# Patient Record
Sex: FEMALE | Race: ASIAN | NOT HISPANIC OR LATINO | URBAN - METROPOLITAN AREA
[De-identification: names, ages, dates, MRNs, and addresses within clinical notes are randomized per-mention and may not be internally consistent; named-entity substitution may affect disease eponyms.]

---

## 2019-08-02 ENCOUNTER — EMERGENCY (EMERGENCY)
Facility: HOSPITAL | Age: 54
LOS: 1 days | Discharge: HOME | End: 2019-08-02
Attending: EMERGENCY MEDICINE | Admitting: EMERGENCY MEDICINE
Payer: SELF-PAY

## 2019-08-02 VITALS
DIASTOLIC BLOOD PRESSURE: 78 MMHG | SYSTOLIC BLOOD PRESSURE: 146 MMHG | RESPIRATION RATE: 16 BRPM | OXYGEN SATURATION: 100 % | HEART RATE: 64 BPM | TEMPERATURE: 98 F

## 2019-08-02 VITALS
TEMPERATURE: 98 F | OXYGEN SATURATION: 100 % | RESPIRATION RATE: 16 BRPM | HEART RATE: 63 BPM | SYSTOLIC BLOOD PRESSURE: 142 MMHG | DIASTOLIC BLOOD PRESSURE: 67 MMHG

## 2019-08-02 PROCEDURE — 70450 CT HEAD/BRAIN W/O DYE: CPT | Mod: 26

## 2019-08-02 PROCEDURE — 70486 CT MAXILLOFACIAL W/O DYE: CPT | Mod: 26

## 2019-08-02 PROCEDURE — 99284 EMERGENCY DEPT VISIT MOD MDM: CPT

## 2019-08-02 RX ORDER — ACETAMINOPHEN 500 MG
975 TABLET ORAL ONCE
Refills: 0 | Status: COMPLETED | OUTPATIENT
Start: 2019-08-02 | End: 2019-08-02

## 2019-08-02 RX ADMIN — Medication 975 MILLIGRAM(S): at 12:47

## 2019-08-02 NOTE — ED ADULT TRIAGE NOTE - CHIEF COMPLAINT QUOTE
Slip and fall in bathroom, hit right side of head om shower wall, + LOC. Today woke up with increase in facial pain, headache and right eye discharge. Denies n/v, chest pain or SOB. Ice pack provided.

## 2019-08-02 NOTE — ED PROVIDER NOTE - CLINICAL SUMMARY MEDICAL DECISION MAKING FREE TEXT BOX
pt with worsening headache after slip and fall to head yesterday w/ positive LOC, will CT r/o bleed, also bony facial pain w/o any deformity, will CT max fac r/o occult fx, nml extraoculars w/o e/o entrapment, will pain control

## 2019-08-02 NOTE — ED PROVIDER NOTE - NS ED ROS FT
Gen: No fever, normal appetite  Eyes: No eye irritation or discharge  ENT: + right facial pain   Resp: No cough or trouble breathing  Cardiovascular: No chest pain or palpitation  Gastroenteric: No nausea/vomiting, diarrhea, constipation  :  No change in urine output; no dysuria  MS: No joint or muscle pain  Skin: No rashes  Neuro: + headache   Remainder negative, except as per the HPI

## 2019-08-02 NOTE — ED PROVIDER NOTE - PHYSICAL EXAMINATION
GEN - NAD; well appearing; A+O x3   HEAD - frontal ecchymosis   EYES - minimal periorbital ecchymosis, EOMI, no conjunctival pallor, no scleral icterus  ENT -   mucous membranes  moist , no discharge      NECK - Neck supple  PULM - CTA b/l,  symmetric breath sounds  COR -  RRR, S1 S2, no murmurs  ABD - , ND, NT, soft, no guarding, no rebound, no masses    BACK - no CVA tenderness, nontender spine     EXTREMS - no edema, no deformity, warm and well perfused    SKIN - no rash or bruising      NEUROLOGIC - alert, sensation nl, motor 5/5 RUE/LUE/RLE/LLE

## 2019-08-02 NOTE — ED PROVIDER NOTE - NSFOLLOWUPINSTRUCTIONS_ED_ALL_ED_FT
AndreaicBosnianCanadian [facility] HospitalssianSpanishTagalogTraditional ChineseVietnamese    Concussion, Adult  Image   A concussion is a brain injury from a direct hit (blow) to the head or body. This blow causes the brain to shake quickly back and forth inside the skull. This can damage brain cells and cause chemical changes in the brain. A concussion may also be known as a mild traumatic brain injury (TBI).    Concussions are usually not life-threatening, but the effects of a concussion can be serious. If you have a concussion, you should be very careful to avoid having a second concussion.    What are the causes?  This condition is caused by:  A direct blow to your head, such as from running into another player during a game, being hit in a fight, or hitting your head on a hard surface.  Sudden movement of your body that causes your brain to move back and forth inside the skull, such as in a car crash.  What are the signs or symptoms?  The signs of a concussion can be hard to notice. Early on, they may be missed by you, family members, and health care providers. You may look fine on the outside, but may act or feel differently.    Symptoms are usually temporary, and for most adults, the symptoms improve in 7–10 days. Some symptoms may appear right away, but other symptoms may not show up for hours or days. If your symptoms last longer than normal, you may have post-concussion syndrome. Every head injury is different.    Physical symptoms     Headaches. This can include a feeling of pressure in the head or migraine-like symptoms.  Tiredness (fatigue).  Dizziness.  Problems with coordination or balance.  Vision or hearing problems.  Sensitivity to light or noise.  Nausea or vomiting.  Changes in eating or sleeping patterns.  Numbness or tingling.  Mental and emotional symptoms     Memory problems.  Trouble concentrating, organizing, or making decisions.  Slowness in thinking, acting or reacting, speaking, or reading.  Irritability or mood changes.  Anxiety or depression.  How is this diagnosed?  This condition is diagnosed based on:  Your symptoms.  A description of your injury.  You may also have tests, including:  Imaging tests, such as a CT scan or MRI. These are done to look for signs of brain injury.  Neuropsychological tests. These measure your thinking, understanding, learning, and remembering abilities.  How is this treated?  Treatment for this condition includes:  Stopping sports or activity if you are injured. Anyone who hits their head or shows signs of a concussion should not return to sports or activities the same day, and they should not return until they are checked by a health care provider.  Physical and mental rest and careful observation, usually at home. Gradually return to your normal activities.  Medicine. You may be prescribed medicines to help with symptoms such as headaches, nausea, or difficulty sleeping.  Avoid taking opioid pain medicine while recovering from a concussion.  Avoiding alcohol and drugs. Alcohol and certain drugs may slow your recovery and can put you at risk of further injury.  Referral to a concussion clinic or rehabilitation center.  How fast you will recover from a concussion depends on many factors. Recovery can take time. It is important to wait to return to activity until a health care provider says that it is safe and your symptoms are completely gone.    Follow these instructions at home:  Activity     Limit activities that require a lot of thought or concentration, such as:  Doing homework or job-related work.  Watching TV.  Working on the computer.  Playing memory games and puzzles.  Rest. Rest helps your brain heal. Make sure you:  Get plenty of sleep. Most adults should get 7–9 hours of sleep each night.  Rest during the day. Take naps or rest breaks when you feel tired.  Do not do high-risk activities that could cause a second concussion, such as riding a bike or playing sports. Having another concussion before the first one has healed can be dangerous.  Ask your health care provider when you can return to your normal activities, such as school, work, athletics, and driving. Your ability to react may be slower after a brain injury. Never do these activities if you are dizzy. Your health care provider will likely give you a plan for gradually returning to activities.  General instructions     Take over-the-counter and prescription medicines only as told by your health care provider. Some medicines, such as blood thinners (anticoagulants) and aspirin, may increase the chance of complications, such as bleeding.  Do not drink alcohol until your health care provider says you can.  Watch your symptoms and tell others to do the same. Complications sometimes occur after a concussion. Older adults with a brain injury may have a higher risk of serious complications.  Tell your , teachers, school nurse, school counselor, , or  about your injury, symptoms, and restrictions. Tell them about what you can or cannot do. They should watch you for worsening symptoms.  Keep all follow-up visits as told by your health care provider. This is important.  How is this prevented?  Avoiding another brain injury is very important, especially as you recover. In rare cases, another injury can lead to permanent brain damage, brain swelling, or death. The risk of this is greatest during the first 7–10 days after a head injury. Avoid injuries by:  Avoiding activities that could lead to a second concussion, such as contact or recreational sports, until your health care provider says it is okay.  Taking these actions once you have returned to sports or activities:  Avoiding plays or moves that can cause you to crash into another person. This is how most concussions occur.  Following the rules and being respectful of other players. Do not engage in violent or illegal plays.  Getting regular exercise that includes strength and balance training.  Wear a properly fitting helmet during sports, biking, or other activities. Helmets can help protect you from serious skull and brain injuries, but they do not protect you from a concussion. Even when wearing a helmet, you should avoid being hit in the head.    Contact a health care provider if:  Your symptoms get worse or they do not improve.  You have new symptoms.  You have another injury.  Get help right away if:  You have severe or worsening headaches.  You have weakness or numbness in any part of your body.  You are confused.  Your coordination gets worse.  You vomit repeatedly.  You are sleepier than normal.  You have convulsions.  Your speech is slurred.  You cannot recognize people or places.  You have a seizure.  It is difficult to wake you up.  You have unusual behavior changes.  You have changes in your vision.  You lose consciousness.  Summary  A concussion is a brain injury from a direct hit (blow) to your head or body.  You may have imaging tests and neuropsychological tests to diagnose a concussion.  Treatment for this condition includes physical and mental rest and careful observation.  Ask your health care provider when you can return to your normal activities, such as school, work, athletics, and driving. Follow safety instructions as told by your health care provider.  This information is not intended to replace advice given to you by your health care provider. Make sure you discuss any questions you have with your health care provider.    Document Released: 03/09/2005 Document Revised: 01/24/2019 Document Reviewed: 01/24/2019  [employer] Interactive Patient Education © 2019 [employer] Concussion, Adult  Image   A concussion is a brain injury from a direct hit (blow) to the head or body. This blow causes the brain to shake quickly back and forth inside the skull. This can damage brain cells and cause chemical changes in the brain. A concussion may also be known as a mild traumatic brain injury (TBI).    Concussions are usually not life-threatening, but the effects of a concussion can be serious. If you have a concussion, you should be very careful to avoid having a second concussion.    What are the causes?  This condition is caused by:  A direct blow to your head, such as from running into another player during a game, being hit in a fight, or hitting your head on a hard surface.  Sudden movement of your body that causes your brain to move back and forth inside the skull, such as in a car crash.  What are the signs or symptoms?  The signs of a concussion can be hard to notice. Early on, they may be missed by you, family members, and health care providers. You may look fine on the outside, but may act or feel differently.    Symptoms are usually temporary, and for most adults, the symptoms improve in 7–10 days. Some symptoms may appear right away, but other symptoms may not show up for hours or days. If your symptoms last longer than normal, you may have post-concussion syndrome. Every head injury is different.    Physical symptoms     Headaches. This can include a feeling of pressure in the head or migraine-like symptoms.  Tiredness (fatigue).  Dizziness.  Problems with coordination or balance.  Vision or hearing problems.  Sensitivity to light or noise.  Nausea or vomiting.  Changes in eating or sleeping patterns.  Numbness or tingling.  Mental and emotional symptoms     Memory problems.  Trouble concentrating, organizing, or making decisions.  Slowness in thinking, acting or reacting, speaking, or reading.  Irritability or mood changes.  Anxiety or depression.  How is this diagnosed?  This condition is diagnosed based on:  Your symptoms.  A description of your injury.  You may also have tests, including:  Imaging tests, such as a CT scan or MRI. These are done to look for signs of brain injury.  Neuropsychological tests. These measure your thinking, understanding, learning, and remembering abilities.  How is this treated?  Treatment for this condition includes:  Stopping sports or activity if you are injured. Anyone who hits their head or shows signs of a concussion should not return to sports or activities the same day, and they should not return until they are checked by a health care provider.  Physical and mental rest and careful observation, usually at home. Gradually return to your normal activities.  Medicine. You may be prescribed medicines to help with symptoms such as headaches, nausea, or difficulty sleeping.  Avoid taking opioid pain medicine while recovering from a concussion.  Avoiding alcohol and drugs. Alcohol and certain drugs may slow your recovery and can put you at risk of further injury.  Referral to a concussion clinic or rehabilitation center.  How fast you will recover from a concussion depends on many factors. Recovery can take time. It is important to wait to return to activity until a health care provider says that it is safe and your symptoms are completely gone.    Follow these instructions at home:  Activity     Limit activities that require a lot of thought or concentration, such as:  Doing homework or job-related work.  Watching TV.  Working on the computer.  Playing memory games and puzzles.  Rest. Rest helps your brain heal. Make sure you:  Get plenty of sleep. Most adults should get 7–9 hours of sleep each night.  Rest during the day. Take naps or rest breaks when you feel tired.  Do not do high-risk activities that could cause a second concussion, such as riding a bike or playing sports. Having another concussion before the first one has healed can be dangerous.  Ask your health care provider when you can return to your normal activities, such as school, work, athletics, and driving. Your ability to react may be slower after a brain injury. Never do these activities if you are dizzy. Your health care provider will likely give you a plan for gradually returning to activities.  General instructions     Take over-the-counter and prescription medicines only as told by your health care provider. Some medicines, such as blood thinners (anticoagulants) and aspirin, may increase the chance of complications, such as bleeding.  Do not drink alcohol until your health care provider says you can.  Watch your symptoms and tell others to do the same. Complications sometimes occur after a concussion. Older adults with a brain injury may have a higher risk of serious complications.  Tell your , teachers, school nurse, school counselor, , or  about your injury, symptoms, and restrictions. Tell them about what you can or cannot do. They should watch you for worsening symptoms.  Keep all follow-up visits as told by your health care provider. This is important.  How is this prevented?  Avoiding another brain injury is very important, especially as you recover. In rare cases, another injury can lead to permanent brain damage, brain swelling, or death. The risk of this is greatest during the first 7–10 days after a head injury. Avoid injuries by:  Avoiding activities that could lead to a second concussion, such as contact or recreational sports, until your health care provider says it is okay.  Taking these actions once you have returned to sports or activities:  Avoiding plays or moves that can cause you to crash into another person. This is how most concussions occur.  Following the rules and being respectful of other players. Do not engage in violent or illegal plays.  Getting regular exercise that includes strength and balance training.  Wear a properly fitting helmet during sports, biking, or other activities. Helmets can help protect you from serious skull and brain injuries, but they do not protect you from a concussion. Even when wearing a helmet, you should avoid being hit in the head.    Contact a health care provider if:  Your symptoms get worse or they do not improve.  You have new symptoms.  You have another injury.  Get help right away if:  You have severe or worsening headaches.  You have weakness or numbness in any part of your body.  You are confused.  Your coordination gets worse.  You vomit repeatedly.  You are sleepier than normal.  You have convulsions.  Your speech is slurred.  You cannot recognize people or places.  You have a seizure.  It is difficult to wake you up.  You have unusual behavior changes.  You have changes in your vision.  You lose consciousness.  Summary  A concussion is a brain injury from a direct hit (blow) to your head or body.  You may have imaging tests and neuropsychological tests to diagnose a concussion.  Treatment for this condition includes physical and mental rest and careful observation.  Ask your health care provider when you can return to your normal activities, such as school, work, athletics, and driving. Follow safety instructions as told by your health care provider.  This information is not intended to replace advice given to you by your health care provider. Make sure you discuss any questions you have with your health care provider.    Document Released: 03/09/2005 Document Revised: 01/24/2019 Document Reviewed: 01/24/2019  [employer] Interactive Patient Education © 2019 [employer]

## 2019-08-02 NOTE — ED PROVIDER NOTE - OBJECTIVE STATEMENT
55yo f without significant past medical history presents s/p fall yesterday. Pt slipped in bathroom, fell hit right face, + LOC x 5 min per son who heard thud. Returned to baseline upon waking. No shaking movements or incontinence. Pt today with right facial pain + headache. Pt has not taken anything for the pain. No blurred vision. No numbness, tingling, or weakness. Feels otherwise well, no cough, fevers, chills, chest pain, abdominal pain, urinary symptoms.

## 2020-07-20 PROBLEM — Z78.9 OTHER SPECIFIED HEALTH STATUS: Chronic | Status: ACTIVE | Noted: 2019-08-02

## 2020-10-07 ENCOUNTER — APPOINTMENT (OUTPATIENT)
Dept: VASCULAR SURGERY | Facility: CLINIC | Age: 55
End: 2020-10-07

## 2020-10-07 PROBLEM — Z00.00 ENCOUNTER FOR PREVENTIVE HEALTH EXAMINATION: Status: ACTIVE | Noted: 2020-10-07

## 2022-06-05 ENCOUNTER — NON-APPOINTMENT (OUTPATIENT)
Age: 57
End: 2022-06-05

## 2022-08-27 ENCOUNTER — NON-APPOINTMENT (OUTPATIENT)
Age: 57
End: 2022-08-27

## 2022-10-12 ENCOUNTER — APPOINTMENT (OUTPATIENT)
Dept: VASCULAR SURGERY | Facility: CLINIC | Age: 57
End: 2022-10-12

## 2022-10-14 ENCOUNTER — APPOINTMENT (OUTPATIENT)
Dept: VASCULAR SURGERY | Facility: CLINIC | Age: 57
End: 2022-10-14

## 2022-10-14 VITALS
TEMPERATURE: 97.1 F | HEART RATE: 74 BPM | SYSTOLIC BLOOD PRESSURE: 122 MMHG | HEIGHT: 62 IN | DIASTOLIC BLOOD PRESSURE: 77 MMHG | WEIGHT: 167 LBS | BODY MASS INDEX: 30.73 KG/M2

## 2022-10-14 DIAGNOSIS — I83.893 VARICOSE VEINS OF BILATERAL LOWER EXTREMITIES WITH OTHER COMPLICATIONS: ICD-10-CM

## 2022-10-14 PROCEDURE — 99204 OFFICE O/P NEW MOD 45 MIN: CPT

## 2022-10-14 PROCEDURE — 93970 EXTREMITY STUDY: CPT

## 2022-10-14 NOTE — PHYSICAL EXAM
[Respiratory Effort] : normal respiratory effort [2+] : left 2+ [Ankle Swelling Bilaterally] : bilaterally  [Varicose Veins Of Lower Extremities] : bilaterally [de-identified] : NAD [de-identified] : bilateral lower extremities varicosities noted on bilateral ankles. Mild swelling appreciated. No open wounds.

## 2022-10-14 NOTE — ASSESSMENT
[FreeTextEntry1] : Patient is a 57 year old female presenting with R sided leg pain, swelling concerning for venous insufficiency \par \par - Recommend Podiatry evaluation for heel bone with bearing weight \par - Venous duplexes with no signs of VI, but with large varicosities \par - Recommend Compression stockings (script given) \par - Will follow up in 3 months to evaluate resolution of symptoms

## 2022-10-14 NOTE — REASON FOR VISIT
[Acute] : an acute visit [Family Member] : family member [FreeTextEntry1] : bilateral lower extremity swelling R worse than L

## 2022-10-14 NOTE — END OF VISIT
[] : Fellow [FreeTextEntry3] : rec trial of copmression.  If still symptomatic could consider VV stab phleb.  This however would not alleviate her heel pain, I have advised podiatrist eval\par \par fu 3 mo [Time Spent: ___ minutes] : I have spent [unfilled] minutes of time on the encounter.

## 2022-10-14 NOTE — HISTORY OF PRESENT ILLNESS
[FreeTextEntry1] : Patient is a 57 year old F with no signifcant PMHx who presents to the clinic with R leg pain and swelling around the ankles. She states she has had swelling that has worsening over the past few years. She also complains of right heel pain when bearing weight. She states that at the end of the day she notices right ankle swell up to below the knee which causes her heaviness. She states the varicose veins on her legs have been getting larger. She denies any history of surgeries or trauma to the lower extremities. She denies any history of claudication to either side. No history of wounds.